# Patient Record
Sex: MALE | Race: WHITE | NOT HISPANIC OR LATINO | Employment: FULL TIME | URBAN - METROPOLITAN AREA
[De-identification: names, ages, dates, MRNs, and addresses within clinical notes are randomized per-mention and may not be internally consistent; named-entity substitution may affect disease eponyms.]

---

## 2023-10-07 PROBLEM — R03.0 ELEVATED BLOOD PRESSURE READING: Status: ACTIVE | Noted: 2023-10-07

## 2023-10-07 PROBLEM — L65.9 ALOPECIA: Status: ACTIVE | Noted: 2023-10-07

## 2023-10-07 PROBLEM — R94.5 ABNORMAL LIVER FUNCTION: Status: ACTIVE | Noted: 2023-10-07

## 2023-10-07 PROBLEM — F17.200 NICOTINE DEPENDENCE: Status: ACTIVE | Noted: 2023-10-07

## 2023-10-07 PROBLEM — L63.9 ALOPECIA AREATA, UNSPECIFIED: Status: ACTIVE | Noted: 2021-12-02

## 2023-10-07 RX ORDER — VARENICLINE TARTRATE 1 MG/1
1 TABLET, FILM COATED ORAL 2 TIMES DAILY
COMMUNITY
Start: 2021-09-20 | End: 2023-10-13 | Stop reason: ALTCHOICE

## 2023-10-07 RX ORDER — VARENICLINE TARTRATE 0.5 (11)-1
KIT ORAL
COMMUNITY
Start: 2021-07-19 | End: 2023-10-13 | Stop reason: ALTCHOICE

## 2023-10-12 ASSESSMENT — PROMIS GLOBAL HEALTH SCALE
RATE_AVERAGE_FATIGUE: MILD
RATE_AVERAGE_PAIN: 0
CARRYOUT_SOCIAL_ACTIVITIES: GOOD
RATE_PHYSICAL_HEALTH: GOOD
RATE_QUALITY_OF_LIFE: VERY GOOD
EMOTIONAL_PROBLEMS: SOMETIMES
RATE_GENERAL_HEALTH: GOOD
CARRYOUT_PHYSICAL_ACTIVITIES: MOSTLY
RATE_MENTAL_HEALTH: GOOD
RATE_SOCIAL_SATISFACTION: GOOD

## 2023-10-13 ENCOUNTER — OFFICE VISIT (OUTPATIENT)
Dept: PRIMARY CARE | Facility: CLINIC | Age: 43
End: 2023-10-13
Payer: COMMERCIAL

## 2023-10-13 ENCOUNTER — HOSPITAL ENCOUNTER (OUTPATIENT)
Dept: RADIOLOGY | Facility: HOSPITAL | Age: 43
Discharge: HOME | End: 2023-10-13
Payer: COMMERCIAL

## 2023-10-13 VITALS
HEIGHT: 75 IN | TEMPERATURE: 94.6 F | BODY MASS INDEX: 34.57 KG/M2 | WEIGHT: 278 LBS | SYSTOLIC BLOOD PRESSURE: 120 MMHG | DIASTOLIC BLOOD PRESSURE: 80 MMHG

## 2023-10-13 DIAGNOSIS — R16.0 HEPATOMEGALY: ICD-10-CM

## 2023-10-13 DIAGNOSIS — Z23 NEED FOR VACCINATION: ICD-10-CM

## 2023-10-13 DIAGNOSIS — Z00.00 GENERAL MEDICAL EXAM: Primary | ICD-10-CM

## 2023-10-13 DIAGNOSIS — F17.210 CIGARETTE SMOKER: ICD-10-CM

## 2023-10-13 LAB
ALBUMIN SERPL BCP-MCNC: 4.7 G/DL (ref 3.4–5)
ALP SERPL-CCNC: 41 U/L (ref 33–120)
ALT SERPL W P-5'-P-CCNC: 49 U/L (ref 10–52)
ANION GAP SERPL CALC-SCNC: 15 MMOL/L (ref 10–20)
AST SERPL W P-5'-P-CCNC: 28 U/L (ref 9–39)
BASOPHILS # BLD AUTO: 0.1 X10*3/UL (ref 0–0.1)
BASOPHILS NFR BLD AUTO: 1.2 %
BILIRUB SERPL-MCNC: 0.6 MG/DL (ref 0–1.2)
BUN SERPL-MCNC: 15 MG/DL (ref 6–23)
CALCIUM SERPL-MCNC: 9.7 MG/DL (ref 8.6–10.3)
CHLORIDE SERPL-SCNC: 101 MMOL/L (ref 98–107)
CHOLEST SERPL-MCNC: 150 MG/DL (ref 0–199)
CHOLESTEROL/HDL RATIO: 4
CO2 SERPL-SCNC: 26 MMOL/L (ref 21–32)
CREAT SERPL-MCNC: 1.24 MG/DL (ref 0.5–1.3)
EOSINOPHIL # BLD AUTO: 0.46 X10*3/UL (ref 0–0.7)
EOSINOPHIL NFR BLD AUTO: 5.7 %
ERYTHROCYTE [DISTWIDTH] IN BLOOD BY AUTOMATED COUNT: 12.7 % (ref 11.5–14.5)
EST. AVERAGE GLUCOSE BLD GHB EST-MCNC: 105 MG/DL
GFR SERPL CREATININE-BSD FRML MDRD: 74 ML/MIN/1.73M*2
GLUCOSE SERPL-MCNC: 86 MG/DL (ref 74–99)
HBA1C MFR BLD: 5.3 %
HCT VFR BLD AUTO: 50.2 % (ref 41–52)
HDLC SERPL-MCNC: 37.4 MG/DL
HGB BLD-MCNC: 16.9 G/DL (ref 13.5–17.5)
IMM GRANULOCYTES # BLD AUTO: 0.02 X10*3/UL (ref 0–0.7)
IMM GRANULOCYTES NFR BLD AUTO: 0.2 % (ref 0–0.9)
LDLC SERPL CALC-MCNC: 88 MG/DL
LYMPHOCYTES # BLD AUTO: 3.02 X10*3/UL (ref 1.2–4.8)
LYMPHOCYTES NFR BLD AUTO: 37.1 %
MCH RBC QN AUTO: 31.4 PG (ref 26–34)
MCHC RBC AUTO-ENTMCNC: 33.7 G/DL (ref 32–36)
MCV RBC AUTO: 93 FL (ref 80–100)
MONOCYTES # BLD AUTO: 0.56 X10*3/UL (ref 0.1–1)
MONOCYTES NFR BLD AUTO: 6.9 %
NEUTROPHILS # BLD AUTO: 3.98 X10*3/UL (ref 1.2–7.7)
NEUTROPHILS NFR BLD AUTO: 48.9 %
NON HDL CHOLESTEROL: 113 MG/DL (ref 0–149)
NRBC BLD-RTO: 0 /100 WBCS (ref 0–0)
PLATELET # BLD AUTO: 244 X10*3/UL (ref 150–450)
PMV BLD AUTO: 9.9 FL (ref 7.5–11.5)
POTASSIUM SERPL-SCNC: 4.7 MMOL/L (ref 3.5–5.3)
PROT SERPL-MCNC: 7.8 G/DL (ref 6.4–8.2)
RBC # BLD AUTO: 5.38 X10*6/UL (ref 4.5–5.9)
SODIUM SERPL-SCNC: 137 MMOL/L (ref 136–145)
TRIGL SERPL-MCNC: 125 MG/DL (ref 0–149)
TSH SERPL-ACNC: 1.72 MIU/L (ref 0.44–3.98)
VLDL: 25 MG/DL (ref 0–40)
WBC # BLD AUTO: 8.1 X10*3/UL (ref 4.4–11.3)

## 2023-10-13 PROCEDURE — 76705 ECHO EXAM OF ABDOMEN: CPT | Performed by: RADIOLOGY

## 2023-10-13 PROCEDURE — 85025 COMPLETE CBC W/AUTO DIFF WBC: CPT

## 2023-10-13 PROCEDURE — 84443 ASSAY THYROID STIM HORMONE: CPT

## 2023-10-13 PROCEDURE — 99396 PREV VISIT EST AGE 40-64: CPT | Performed by: FAMILY MEDICINE

## 2023-10-13 PROCEDURE — 76705 ECHO EXAM OF ABDOMEN: CPT

## 2023-10-13 PROCEDURE — 90686 IIV4 VACC NO PRSV 0.5 ML IM: CPT | Performed by: FAMILY MEDICINE

## 2023-10-13 PROCEDURE — 80053 COMPREHEN METABOLIC PANEL: CPT

## 2023-10-13 PROCEDURE — 80061 LIPID PANEL: CPT

## 2023-10-13 PROCEDURE — 90471 IMMUNIZATION ADMIN: CPT | Performed by: FAMILY MEDICINE

## 2023-10-13 PROCEDURE — 36415 COLL VENOUS BLD VENIPUNCTURE: CPT

## 2023-10-13 PROCEDURE — 83036 HEMOGLOBIN GLYCOSYLATED A1C: CPT

## 2023-10-13 RX ORDER — VARENICLINE TARTRATE 0.5 (11)-1
KIT ORAL
Qty: 53 EACH | Refills: 0 | Status: SHIPPED | OUTPATIENT
Start: 2023-10-13

## 2023-10-13 ASSESSMENT — ENCOUNTER SYMPTOMS
SORE THROAT: 0
RHINORRHEA: 0
HEADACHES: 0
MYALGIAS: 0
CONSTIPATION: 0
NUMBNESS: 0
DYSPHORIC MOOD: 0
DEPRESSION: 0
HEMATURIA: 0
FATIGUE: 0
WOUND: 0
VOMITING: 0
SHORTNESS OF BREATH: 0
DYSURIA: 0
BACK PAIN: 0
WHEEZING: 0
TROUBLE SWALLOWING: 0
DIARRHEA: 0
DIZZINESS: 0
NERVOUS/ANXIOUS: 1
ABDOMINAL PAIN: 0
COUGH: 0
SINUS PAIN: 0
SLEEP DISTURBANCE: 0
ARTHRALGIAS: 0
PALPITATIONS: 0
VOICE CHANGE: 0
FEVER: 0
BLOOD IN STOOL: 0
FREQUENCY: 0
WEAKNESS: 0
NAUSEA: 0
ADENOPATHY: 0

## 2023-10-13 ASSESSMENT — PATIENT HEALTH QUESTIONNAIRE - PHQ9
2. FEELING DOWN, DEPRESSED OR HOPELESS: NOT AT ALL
1. LITTLE INTEREST OR PLEASURE IN DOING THINGS: NOT AT ALL
SUM OF ALL RESPONSES TO PHQ9 QUESTIONS 1 AND 2: 0

## 2023-10-13 NOTE — PATIENT INSTRUCTIONS
I will order labs and a flu shot will be given.    Smoking is a common cause of heart attacks, stroke, many cancers, peripheral vascular disease and respiratory infections such as pneumonia.  It is very important to your good health that you stop smoking.  There are several medications such as Chantix, Nicoderm and Wellbutrin that can help you stop smoking.  Chantix was prescribed. Smoking cessation can also save you a lot of money in the cost of buying cigarettes.      Weight loss is recommended.  Try to avoid sugars and starches in the diet.  Increase the intensity and frequency of exercise.      An ultrasound of the liver will be ordered.  You had liver enlargement on a CT scan from five years ago and your father had a history of non-alcoholic related cirrhosis.

## 2023-10-13 NOTE — PROGRESS NOTES
"Subjective   Patient ID: [id number]     Edward Paz is a 43 y.o. male who presents for Annual Exam (Fasting).  HPI  He is here for a general medical examination.      No new medical problems.  No hospitalizations. No surgeries.      Family hx.  Mom HTN.  Dad just passed at 73 from non-alcoholic cirrhosis. Dad had type two DM and ?skin cancer.  Son recently diagnosed with type one DM.    Drinks alcohol a couple of times per year.    Still smokes.  1/2 PPD.    No drug use.      He does exercise about once a week.    He maintains a healthy diet.  Avoids sugar drinks.  Does not avoid sugar foods as much as he should.  Tries to avoid fatty foods.      Sees a dentist twice a year.  Plans oral surgery dental implant.    Review of Systems   Constitutional:  Negative for fatigue and fever.   HENT:  Negative for congestion, rhinorrhea, sinus pain, sore throat, trouble swallowing and voice change.    Respiratory:  Negative for cough, shortness of breath and wheezing.    Cardiovascular:  Negative for chest pain, palpitations and leg swelling.   Gastrointestinal:  Negative for abdominal pain, blood in stool, constipation, diarrhea, nausea and vomiting.   Genitourinary:  Negative for dysuria, frequency (nocturia with drinking fluids.) and hematuria.   Musculoskeletal:  Negative for arthralgias, back pain and myalgias.   Skin:  Negative for rash and wound.   Neurological:  Negative for dizziness, syncope, weakness, numbness and headaches.   Hematological:  Negative for adenopathy.   Psychiatric/Behavioral:  Negative for dysphoric mood (nothing outside the ordinary with father passing.), self-injury, sleep disturbance and suicidal ideas. The patient is nervous/anxious.        Objective     /80 (BP Location: Left arm, Patient Position: Sitting)   Temp 34.8 °C (94.6 °F) (Skin)   Ht 1.892 m (6' 2.5\")   Wt 126 kg (278 lb)   BMI 35.22 kg/m²      Physical Exam  Vitals reviewed.   Constitutional:       General: He is not in acute " distress.     Appearance: Normal appearance. He is not ill-appearing or toxic-appearing.   HENT:      Head: Normocephalic and atraumatic.      Right Ear: Tympanic membrane, ear canal and external ear normal.      Left Ear: Tympanic membrane, ear canal and external ear normal.      Nose: Nose normal.      Mouth/Throat:      Mouth: Mucous membranes are moist.   Eyes:      Extraocular Movements: Extraocular movements intact.      Conjunctiva/sclera: Conjunctivae normal.      Pupils: Pupils are equal, round, and reactive to light.   Cardiovascular:      Rate and Rhythm: Normal rate and regular rhythm.      Heart sounds: No murmur heard.  Pulmonary:      Effort: Pulmonary effort is normal.      Breath sounds: Normal breath sounds.   Abdominal:      General: Bowel sounds are normal. There is no distension.      Palpations: Abdomen is soft. There is no mass.      Tenderness: There is no abdominal tenderness. There is no guarding or rebound.   Musculoskeletal:         General: No tenderness.      Cervical back: Neck supple.      Right lower leg: No edema.      Left lower leg: No edema.   Skin:     Coloration: Skin is not jaundiced or pale.      Findings: No rash.   Neurological:      General: No focal deficit present.      Mental Status: He is alert and oriented to person, place, and time. Mental status is at baseline.   Psychiatric:         Mood and Affect: Mood normal.         Behavior: Behavior normal.         Thought Content: Thought content normal.         Judgment: Judgment normal.         Assessment/Plan   Problem List Items Addressed This Visit    None  Visit Diagnoses       General medical exam    -  Primary    Relevant Orders    CBC and Auto Differential    Hemoglobin A1C    Comprehensive Metabolic Panel    Lipid Panel    Thyroid Stimulating Hormone    Need for vaccination        Hepatomegaly        Relevant Orders    US abdomen limited liver    Cigarette smoker        Relevant Medications    varenicline (Chantix  Starting Month Box) 0.5 mg (11)- 1 mg (42) tablet        I will order labs and a flu shot will be given.    Smoking is a common cause of heart attacks, stroke, many cancers, peripheral vascular disease and respiratory infections such as pneumonia.  It is very important to your good health that you stop smoking.  There are several medications such as Chantix, Nicoderm and Wellbutrin that can help you stop smoking.  Chantix was prescribed. Smoking cessation can also save you a lot of money in the cost of buying cigarettes.      Weight loss is recommended.  Try to avoid sugars and starches in the diet.  Increase the intensity and frequency of exercise.      An ultrasound of the liver will be ordered.  You had liver enlargement on a CT scan from five years ago and your father had a history of non-alcoholic related cirrhosis.      Sahil Reid, DO

## 2023-10-15 DIAGNOSIS — R16.0 HEPATOMEGALY: Primary | ICD-10-CM

## 2023-10-15 DIAGNOSIS — K76.0 FATTY LIVER: ICD-10-CM

## 2023-12-18 DIAGNOSIS — R16.0 HEPATOMEGALY: Primary | ICD-10-CM

## 2024-01-18 ENCOUNTER — APPOINTMENT (OUTPATIENT)
Dept: GASTROENTEROLOGY | Facility: CLINIC | Age: 44
End: 2024-01-18
Payer: COMMERCIAL

## 2025-06-30 ASSESSMENT — PROMIS GLOBAL HEALTH SCALE
EMOTIONAL_PROBLEMS: RARELY
CARRYOUT_PHYSICAL_ACTIVITIES: COMPLETELY
RATE_PHYSICAL_HEALTH: FAIR
RATE_QUALITY_OF_LIFE: GOOD
RATE_AVERAGE_FATIGUE: MILD
RATE_GENERAL_HEALTH: FAIR
RATE_SOCIAL_SATISFACTION: VERY GOOD
RATE_MENTAL_HEALTH: VERY GOOD
CARRYOUT_SOCIAL_ACTIVITIES: VERY GOOD
RATE_AVERAGE_PAIN: 0

## 2025-07-01 ENCOUNTER — APPOINTMENT (OUTPATIENT)
Dept: PRIMARY CARE | Facility: CLINIC | Age: 45
End: 2025-07-01
Payer: COMMERCIAL

## 2025-07-01 ENCOUNTER — OFFICE VISIT (OUTPATIENT)
Dept: DERMATOLOGY | Facility: CLINIC | Age: 45
End: 2025-07-01
Payer: COMMERCIAL

## 2025-07-01 VITALS
BODY MASS INDEX: 35.43 KG/M2 | WEIGHT: 285 LBS | SYSTOLIC BLOOD PRESSURE: 122 MMHG | HEIGHT: 75 IN | TEMPERATURE: 97.5 F | DIASTOLIC BLOOD PRESSURE: 74 MMHG

## 2025-07-01 DIAGNOSIS — D23.9 ANGIOKERATOMA: ICD-10-CM

## 2025-07-01 DIAGNOSIS — L98.9 SKIN LESIONS: ICD-10-CM

## 2025-07-01 DIAGNOSIS — Z80.8 FAMILY HISTORY OF SKIN CANCER: ICD-10-CM

## 2025-07-01 DIAGNOSIS — L63.9 ALOPECIA AREATA: ICD-10-CM

## 2025-07-01 DIAGNOSIS — Z00.00 GENERAL MEDICAL EXAM: Primary | ICD-10-CM

## 2025-07-01 DIAGNOSIS — Z12.83 SCREENING EXAM FOR SKIN CANCER: ICD-10-CM

## 2025-07-01 DIAGNOSIS — F17.210 CIGARETTE SMOKER: ICD-10-CM

## 2025-07-01 DIAGNOSIS — D48.5 NEOPLASM OF UNCERTAIN BEHAVIOR OF SKIN: ICD-10-CM

## 2025-07-01 DIAGNOSIS — D22.9 NEVUS: Primary | ICD-10-CM

## 2025-07-01 PROCEDURE — 3008F BODY MASS INDEX DOCD: CPT | Performed by: FAMILY MEDICINE

## 2025-07-01 PROCEDURE — 99203 OFFICE O/P NEW LOW 30 MIN: CPT | Performed by: NURSE PRACTITIONER

## 2025-07-01 PROCEDURE — 99396 PREV VISIT EST AGE 40-64: CPT | Performed by: FAMILY MEDICINE

## 2025-07-01 RX ORDER — VARENICLINE TARTRATE 0.5 (11)-1
KIT ORAL
Qty: 53 EACH | Refills: 0 | Status: SHIPPED | OUTPATIENT
Start: 2025-07-01

## 2025-07-01 RX ORDER — VARENICLINE TARTRATE 1 MG/1
1 TABLET, FILM COATED ORAL 2 TIMES DAILY
Qty: 60 TABLET | Refills: 2 | Status: SHIPPED | OUTPATIENT
Start: 2025-07-01 | End: 2025-09-29

## 2025-07-01 ASSESSMENT — ENCOUNTER SYMPTOMS
FREQUENCY: 0
DYSURIA: 0
RHINORRHEA: 0
BLOOD IN STOOL: 0
VOMITING: 0
VOICE CHANGE: 0
ADENOPATHY: 0
COUGH: 0
WHEEZING: 0
PALPITATIONS: 0
SHORTNESS OF BREATH: 1
DIARRHEA: 0
DIZZINESS: 0
MYALGIAS: 0
SINUS PAIN: 0
HEMATURIA: 0
SORE THROAT: 0
FEVER: 0
TROUBLE SWALLOWING: 0
NAUSEA: 0
FATIGUE: 0
NERVOUS/ANXIOUS: 0
HEADACHES: 0
WOUND: 0
ABDOMINAL PAIN: 0
BACK PAIN: 0
SLEEP DISTURBANCE: 0
WEAKNESS: 0
ROS SKIN COMMENTS: NO MOLES GROWING OR CHANGING.
ARTHRALGIAS: 0
CONSTIPATION: 0
DYSPHORIC MOOD: 0
NUMBNESS: 0

## 2025-07-01 ASSESSMENT — PATIENT HEALTH QUESTIONNAIRE - PHQ9
1. LITTLE INTEREST OR PLEASURE IN DOING THINGS: NOT AT ALL
SUM OF ALL RESPONSES TO PHQ9 QUESTIONS 1 AND 2: 0
2. FEELING DOWN, DEPRESSED OR HOPELESS: NOT AT ALL

## 2025-07-01 NOTE — PATIENT INSTRUCTIONS
I referred you to a dermatologist for your skin lesions.  I ordered labs today.      Smoking is a common cause of heart attacks, stroke, many cancers, peripheral vascular disease and respiratory infections such as pneumonia.  It is very important to your good health that you stop smoking.  There are several medications such as Chantix, which you have been on before.  This was prescribed again today at your request.  Smoking cessation can also save you a lot of money in the cost of buying cigarettes.

## 2025-07-01 NOTE — Clinical Note
a vascular-appearing lesion on the left scrotum that intermittently bleeds a small amount. He denies pain, itching, or other associated symptoms. No history of trauma to the area. He has not noted any recent growth or changes in the lesion. No prior similar issues reported.    Review of Systems:  Negative for fever, systemic symptoms, or genitourinary complaints.    Physical Exam:  Solitary, violaceous 3 mm papule on the left hemiscrotum. The lesion is non-tender, without fluctuance or signs of infection. No surrounding erythema. Appears consistent with an angiokeratoma. No other lesions noted.    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

## 2025-07-01 NOTE — PROGRESS NOTES
`Subjective     Edward Paz is a 45 y.o. male who presents for the following: Skin Check.   New patient in for full body skin exam, patient was referred from primary care for mole on back. Patient states family history (Dad) of unknown skin cancer.    Review of Systems:  No other skin or systemic complaints other than what is documented elsewhere in the note.    The following portions of the chart were reviewed this encounter and updated as appropriate:       Skin Cancer History  Biopsy Log Book  No skin cancers from Specimen Tracking.    Additional History      Specialty Problems          Dermatology Problems    Alopecia areata, unspecified    Alopecia     Past Medical History:  Edward Paz  has a past medical history of Eczema (1990), Personal history of diseases of the skin and subcutaneous tissue, and Varicella (1986).    Past Surgical History:  Edward Paz  has a past surgical history that includes Circumcision, primary (1981); Vasectomy (2012); and [place] tooth extraction (2000).    Family History:  Patient family history includes Alcohol abuse in his brother; Cancer in his father; Cirrhosis in his father; Diabetes (age of onset: 10 - 19) in his son; Diabetes (age of onset: 60 - 69) in his father; Hearing loss in his father; Heart disease in his maternal grandmother; Hypertension in his brother, brother, and mother; Kidney disease in his father.    Social History:  Edward aPz  reports that he has been smoking cigarettes. He started smoking about 20 years ago. He has a 10.3 pack-year smoking history. He has never used smokeless tobacco. He reports that he does not currently use alcohol after a past usage of about 1.0 standard drink of alcohol per week. He reports that he does not use drugs.    Allergies:  Ciprofloxacin and Penicillins    Current Medications / CAM's:  Current Medications[1]     Objective   Well appearing patient in no apparent distress; mood and affect are within normal  limits.      Assessment/Plan   Skin Exam  1. NEVUS  Generalized  Uniform pigmented macule(s)/papule(s) with reassuring findings on dermoscopy  -Discussed nature of condition  -Reassurance, benign-appearing features on examination today  -Recommend continued observation  2. SCREENING EXAM FOR SKIN CANCER  Generalized  Follow Up In Dermatology  3. FAMILY HISTORY OF SKIN CANCER  Generalized  Family history of MM (father)  4. NEOPLASM OF UNCERTAIN BEHAVIOR OF SKIN  Mid Back    Lesion biopsy  Type of biopsy: tangential    Informed consent: discussed and consent obtained    Timeout: patient name, date of birth, surgical site, and procedure verified    Procedure prep:  Patient was prepped and draped  Anesthesia: the lesion was anesthetized in a standard fashion    Anesthetic:  1% lidocaine w/ epinephrine 1-100,000 local infiltration  Instrument used: DermaBlade    Hemostasis achieved with: aluminum chloride    Outcome: patient tolerated procedure well    Post-procedure details: sterile dressing applied and wound care instructions given    Dressing type: petrolatum and bandage      Staff Communication: Dermatology Local Anesthesia: 1 % Lidocaine / Epinephrine - Amount: 1ml  Specimen 1 - Dermatopathology- DERM LAB  Differential Diagnosis: MM vs DPN vs other  Check Margins Yes/No?:    Comments:    Dermpath Lab: Routine Histopathology (formalin-fixed tissue)  5. ALOPECIA AREATA  Mid Frontal Scalp  3 cm Smooth, circular areas of non-scarring hair loss  -Discussed the nature of the condition  -Discussed treatment options  -Recommend:  -After history, physical examination and discussion, a decision was made to proceed with ILK therapy today    -risks, benefits and alternatives of intralesional steroids was discussed with patient including the risk of atrophy, striae, telangiectasia, and pigmentary changes. These changes are not expected based on the dose and amount of medication to be injected. Pt expresses understanding of this  risks and verbal consent was obtained from the patient to treat with intralesional Kenalog.  -Intralesional kenalog  10mg/ml x 0.3ml was injected to affected area(s) after prepping the skin with alcohol. Pt tolerated the procedure well with no complications. A total of 1 lesion(s) were treated.    triamcinolone acetonide (Kenalog) injection 10 mg - Mid Frontal Scalp    6. ANGIOKERATOMA  Posterior Scrotum  a vascular-appearing lesion on the left scrotum that intermittently bleeds a small amount. He denies pain, itching, or other associated symptoms. No history of trauma to the area. He has not noted any recent growth or changes in the lesion. No prior similar issues reported.    Review of Systems:  Negative for fever, systemic symptoms, or genitourinary complaints.    Physical Exam:  Solitary, violaceous 3 mm papule on the left hemiscrotum. The lesion is non-tender, without fluctuance or signs of infection. No surrounding erythema. Appears consistent with an angiokeratoma. No other lesions noted.    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    PLAN:    Reassurance provided regarding benign nature of the lesion.    Advised gentle skin care and avoidance of friction or trauma to the area.    Offered in-office treatment options (e.g., shave removal, electrocautery), but patient defers at this time.    Advised to return if bleeding becomes more frequent, lesion changes in appearance, or if he desires removal for comfort or cosmetic reasons.            [1]   Current Outpatient Medications:     varenicline tartrate (Chantix Starting Month Box) 0.5 mg (11)- 1 mg (42) tablet, Take as directed on starter pack., Disp: 53 each, Rfl: 0    varenicline tartrate (Chantix) 1 mg tablet, Take 1 tablet (1 mg) by mouth 2 times a day. Take with full glass of water., Disp: 60 tablet, Rfl: 2    Current Facility-Administered Medications:      triamcinolone acetonide (Kenalog) injection 10 mg, 10 mg, intralesional, Once, Maci Kelley, APRN-CNP

## 2025-07-01 NOTE — Clinical Note
PLAN:    Reassurance provided regarding benign nature of the lesion.    Advised gentle skin care and avoidance of friction or trauma to the area.    Offered in-office treatment options (e.g., shave removal, electrocautery), but patient defers at this time.    Advised to return if bleeding becomes more frequent, lesion changes in appearance, or if he desires removal for comfort or cosmetic reasons.

## 2025-07-01 NOTE — Clinical Note
-Discussed the nature of the condition  -Discussed treatment options  -Recommend:  -After history, physical examination and discussion, a decision was made to proceed with ILK therapy today    -risks, benefits and alternatives of intralesional steroids was discussed with patient including the risk of atrophy, striae, telangiectasia, and pigmentary changes. These changes are not expected based on the dose and amount of medication to be injected. Pt expresses understanding of this risks and verbal consent was obtained from the patient to treat with intralesional Kenalog.  -Intralesional kenalog  10mg/ml x 0.3ml was injected to affected area(s) after prepping the skin with alcohol. Pt tolerated the procedure well with no complications. A total of 1 lesion(s) were treated.

## 2025-07-01 NOTE — PROGRESS NOTES
"Subjective   Patient ID: [id number]     Lul Paz \"Zay" is a 45 y.o. male who presents for Annual Exam (Fasting).  HPI  He is here for a general medical examination.  He feels well in general.     No surgery or hospitalization in the last year. He had a dental implant placed and that went well.    He is not on any prescription medication.    Medications Ordered Prior to Encounter[1]    Tobacco Use: High Risk (7/1/2025)    Patient History     Smoking Tobacco Use: Every Day     Smokeless Tobacco Use: Never     Passive Exposure: Not on file   He continues to smoke.  Has smoked twenty one years.  One pack per day.  Very rare alcohol, couple times per year. No drug use.      Occasional exercise.  Active at work, working on the third floor.  He maintains an ok diet but not as healthy as it should be per pt.  Increased fruit and whole grains.  Cut back on red meat.     Family History[2]  Father had skin cancer. Brother has type two DM.  Son with type one DM.  Father had cirrhosis but never drank.       Review of Systems   Constitutional:  Negative for fatigue and fever.   HENT:  Negative for congestion, rhinorrhea, sinus pain, sore throat, trouble swallowing and voice change.    Respiratory:  Positive for shortness of breath (only with physical activity going up and down stairs.). Negative for cough and wheezing.    Cardiovascular:  Negative for chest pain, palpitations and leg swelling.   Gastrointestinal:  Negative for abdominal pain, blood in stool, constipation, diarrhea, nausea and vomiting.   Genitourinary:  Negative for dysuria, frequency, hematuria, scrotal swelling (skin lesion on scrotum) and testicular pain.   Musculoskeletal:  Negative for arthralgias, back pain and myalgias.   Skin:  Negative for rash and wound.        No moles growing or changing.   Neurological:  Negative for dizziness, syncope, weakness, numbness and headaches.   Hematological:  Negative for adenopathy.   Psychiatric/Behavioral:  " "Negative for dysphoric mood, self-injury, sleep disturbance and suicidal ideas. The patient is not nervous/anxious.        Objective     /74   Temp 36.4 °C (97.5 °F) (Skin)   Ht 1.905 m (6' 3\")   Wt 129 kg (285 lb)   BMI 35.62 kg/m²      Physical Exam  Vitals reviewed.   Constitutional:       General: He is not in acute distress.     Appearance: Normal appearance. He is not ill-appearing or toxic-appearing.   HENT:      Head: Normocephalic and atraumatic.      Right Ear: Tympanic membrane, ear canal and external ear normal.      Left Ear: Tympanic membrane, ear canal and external ear normal.      Nose: Nose normal.      Mouth/Throat:      Mouth: Mucous membranes are moist.   Eyes:      Extraocular Movements: Extraocular movements intact.      Conjunctiva/sclera: Conjunctivae normal.      Pupils: Pupils are equal, round, and reactive to light.   Cardiovascular:      Rate and Rhythm: Normal rate and regular rhythm.      Heart sounds: No murmur heard.  Pulmonary:      Effort: Pulmonary effort is normal.      Breath sounds: Normal breath sounds.   Abdominal:      General: Bowel sounds are normal. There is no distension.      Palpations: Abdomen is soft. There is no mass.      Tenderness: There is no abdominal tenderness. There is no guarding or rebound.   Musculoskeletal:         General: No tenderness.      Cervical back: Neck supple.      Right lower leg: No edema.      Left lower leg: No edema.   Skin:     Coloration: Skin is not jaundiced or pale.      Findings: Lesion (two lesions on back.  1 cm with mildly irregular hyperpigmentation.  verrucous lesion on scrotum.  pt states started as a mole) present. No rash.   Neurological:      General: No focal deficit present.      Mental Status: He is alert and oriented to person, place, and time. Mental status is at baseline.   Psychiatric:         Mood and Affect: Mood normal.         Behavior: Behavior normal.         Thought Content: Thought content normal.    "      Judgment: Judgment normal.         Assessment/Plan   Problem List Items Addressed This Visit    None  Visit Diagnoses         General medical exam    -  Primary    Relevant Orders    CBC and Auto Differential    Hemoglobin A1C    Comprehensive Metabolic Panel    Lipid Panel    Thyroid Stimulating Hormone    Urinalysis with Reflex Microscopic      Skin lesions        Relevant Orders    Referral to Dermatology      Cigarette smoker        Relevant Medications    varenicline tartrate (Chantix Starting Month Box) 0.5 mg (11)- 1 mg (42) tablet    varenicline tartrate (Chantix) 1 mg tablet          I referred you to a dermatologist for your skin lesions.  I ordered labs today.      Smoking is a common cause of heart attacks, stroke, many cancers, peripheral vascular disease and respiratory infections such as pneumonia.  It is very important to your good health that you stop smoking.  There are several medications such as Chantix, which you have been on before.  This was prescribed again today at your request.  Smoking cessation can also save you a lot of money in the cost of buying cigarettes.      Sahil Reid, DO          [1]   Current Outpatient Medications on File Prior to Visit   Medication Sig Dispense Refill    [DISCONTINUED] varenicline (Chantix Starting Month Box) 0.5 mg (11)- 1 mg (42) tablet Take as directed on starter pack. 53 each 0     No current facility-administered medications on file prior to visit.   [2]   Family History  Problem Relation Name Age of Onset    Hypertension Mother Nora     Diabetes Father Brandon 60 - 69    Cancer Father Brandon     Hearing loss Father Brandon     Kidney disease Father Brandon     Hypertension Brother      Heart disease Maternal Grandmother Airam     Alcohol abuse Brother Sim     Diabetes Son Marcel 10 - 19    Hypertension Brother Isiah     Cirrhosis Father Brandon

## 2025-07-02 LAB
ALBUMIN SERPL-MCNC: 4.9 G/DL (ref 3.6–5.1)
ALP SERPL-CCNC: 41 U/L (ref 36–130)
ALT SERPL-CCNC: 44 U/L (ref 9–46)
ANION GAP SERPL CALCULATED.4IONS-SCNC: 9 MMOL/L (CALC) (ref 7–17)
APPEARANCE UR: ABNORMAL
AST SERPL-CCNC: 28 U/L (ref 10–40)
BACTERIA #/AREA URNS HPF: ABNORMAL /HPF
BASOPHILS # BLD AUTO: 77 CELLS/UL (ref 0–200)
BASOPHILS NFR BLD AUTO: 0.9 %
BILIRUB SERPL-MCNC: 0.5 MG/DL (ref 0.2–1.2)
BILIRUB UR QL STRIP: ABNORMAL
BUN SERPL-MCNC: 16 MG/DL (ref 7–25)
CALCIUM SERPL-MCNC: 9.6 MG/DL (ref 8.6–10.3)
CHLORIDE SERPL-SCNC: 101 MMOL/L (ref 98–110)
CHOLEST SERPL-MCNC: 173 MG/DL
CHOLEST/HDLC SERPL: 4.7 (CALC)
CO2 SERPL-SCNC: 27 MMOL/L (ref 20–32)
COLOR UR: ABNORMAL
CREAT SERPL-MCNC: 1.16 MG/DL (ref 0.6–1.29)
EGFRCR SERPLBLD CKD-EPI 2021: 80 ML/MIN/1.73M2
EOSINOPHIL # BLD AUTO: 576 CELLS/UL (ref 15–500)
EOSINOPHIL NFR BLD AUTO: 6.7 %
ERYTHROCYTE [DISTWIDTH] IN BLOOD BY AUTOMATED COUNT: 13.1 % (ref 11–15)
EST. AVERAGE GLUCOSE BLD GHB EST-MCNC: 120 MG/DL
EST. AVERAGE GLUCOSE BLD GHB EST-SCNC: 6.6 MMOL/L
GLUCOSE SERPL-MCNC: 99 MG/DL (ref 65–99)
GLUCOSE UR QL STRIP: NEGATIVE
HBA1C MFR BLD: 5.8 %
HCT VFR BLD AUTO: 51.2 % (ref 38.5–50)
HDLC SERPL-MCNC: 37 MG/DL
HGB BLD-MCNC: 17.6 G/DL (ref 13.2–17.1)
HGB UR QL STRIP: NEGATIVE
HYALINE CASTS #/AREA URNS LPF: ABNORMAL /LPF
KETONES UR QL STRIP: ABNORMAL
LDLC SERPL CALC-MCNC: 110 MG/DL (CALC)
LEUKOCYTE ESTERASE UR QL STRIP: ABNORMAL
LYMPHOCYTES # BLD AUTO: 3182 CELLS/UL (ref 850–3900)
LYMPHOCYTES NFR BLD AUTO: 37 %
MCH RBC QN AUTO: 32 PG (ref 27–33)
MCHC RBC AUTO-ENTMCNC: 34.4 G/DL (ref 32–36)
MCV RBC AUTO: 93.1 FL (ref 80–100)
MONOCYTES # BLD AUTO: 628 CELLS/UL (ref 200–950)
MONOCYTES NFR BLD AUTO: 7.3 %
NEUTROPHILS # BLD AUTO: 4137 CELLS/UL (ref 1500–7800)
NEUTROPHILS NFR BLD AUTO: 48.1 %
NITRITE UR QL STRIP: NEGATIVE
NONHDLC SERPL-MCNC: 136 MG/DL (CALC)
PH UR STRIP: ABNORMAL [PH] (ref 5–8)
PLATELET # BLD AUTO: 233 THOUSAND/UL (ref 140–400)
PMV BLD REES-ECKER: 10.1 FL (ref 7.5–12.5)
POTASSIUM SERPL-SCNC: 4.4 MMOL/L (ref 3.5–5.3)
PROT SERPL-MCNC: 8.1 G/DL (ref 6.1–8.1)
PROT UR QL STRIP: ABNORMAL
RBC # BLD AUTO: 5.5 MILLION/UL (ref 4.2–5.8)
RBC #/AREA URNS HPF: ABNORMAL /HPF
SERVICE CMNT-IMP: ABNORMAL
SODIUM SERPL-SCNC: 137 MMOL/L (ref 135–146)
SP GR UR STRIP: 1.02 (ref 1–1.03)
SQUAMOUS #/AREA URNS HPF: ABNORMAL /HPF
TRIGL SERPL-MCNC: 150 MG/DL
TSH SERPL-ACNC: 2.08 MIU/L (ref 0.4–4.5)
WBC # BLD AUTO: 8.6 THOUSAND/UL (ref 3.8–10.8)
WBC #/AREA URNS HPF: ABNORMAL /HPF

## 2025-07-03 LAB
LABORATORY COMMENT REPORT: NORMAL
PATH REPORT.FINAL DX SPEC: NORMAL
PATH REPORT.GROSS SPEC: NORMAL
PATH REPORT.MICROSCOPIC SPEC OTHER STN: NORMAL
PATH REPORT.RELEVANT HX SPEC: NORMAL
PATH REPORT.TOTAL CANCER: NORMAL

## 2026-07-02 ENCOUNTER — APPOINTMENT (OUTPATIENT)
Dept: PRIMARY CARE | Facility: CLINIC | Age: 46
End: 2026-07-02
Payer: COMMERCIAL